# Patient Record
Sex: MALE | Race: WHITE | Employment: FULL TIME | URBAN - METROPOLITAN AREA
[De-identification: names, ages, dates, MRNs, and addresses within clinical notes are randomized per-mention and may not be internally consistent; named-entity substitution may affect disease eponyms.]

---

## 2020-12-22 ENCOUNTER — APPOINTMENT (OUTPATIENT)
Dept: GENERAL RADIOLOGY | Age: 30
End: 2020-12-22
Payer: COMMERCIAL

## 2020-12-22 ENCOUNTER — HOSPITAL ENCOUNTER (EMERGENCY)
Age: 30
Discharge: HOME | End: 2020-12-22
Attending: EMERGENCY MEDICINE
Payer: COMMERCIAL

## 2020-12-22 VITALS
OXYGEN SATURATION: 100 % | HEART RATE: 64 BPM | SYSTOLIC BLOOD PRESSURE: 118 MMHG | TEMPERATURE: 98.6 F | RESPIRATION RATE: 16 BRPM | DIASTOLIC BLOOD PRESSURE: 76 MMHG

## 2020-12-22 PROCEDURE — 73610 X-RAY EXAM OF ANKLE: CPT

## 2020-12-22 PROCEDURE — 73630 X-RAY EXAM OF FOOT: CPT

## 2020-12-22 PROCEDURE — 99282 EMERGENCY DEPT VISIT SF MDM: CPT

## 2020-12-22 ASSESSMENT — ENCOUNTER SYMPTOMS
VOMITING: 0
NAUSEA: 0
RHINORRHEA: 0
CHEST TIGHTNESS: 0
SHORTNESS OF BREATH: 0
SORE THROAT: 0
COUGH: 0

## 2020-12-22 ASSESSMENT — PAIN DESCRIPTION - PAIN TYPE: TYPE: ACUTE PAIN

## 2020-12-22 ASSESSMENT — PAIN DESCRIPTION - LOCATION: LOCATION: FOOT

## 2020-12-22 ASSESSMENT — PAIN SCALES - GENERAL: PAINLEVEL_OUTOF10: 7

## 2020-12-22 ASSESSMENT — PAIN DESCRIPTION - DESCRIPTORS: DESCRIPTORS: ACHING

## 2020-12-22 NOTE — ED PROVIDER NOTES
[address] [facility]  ED  Emergency Department Encounter  Podiatric Medicine and Surgery Resident     Pt Name:George Mcnulty  MRN: [id number]  [place] 1990  Date of evaluation: 12/22/20  PCP:  Сергей Berry MD    CHIEF COMPLAINT       Chief Complaint   Patient presents with    Ankle Pain       HISTORY OF PRESENT ILLNESS  (Location/Symptom, Timing/Onset, Context/Setting, Quality, Duration, Modifying Factors, Severity.)      Elba Avila is a 31 y.o. male who presents with left foot and ankle pain, 4 days since initial onset, burning to dorsal 1st met and aching pain to lateral ankle. The pain is exacerbated with ambulation and relieved with rest.  He has no recollection of the original trauma, but the pain began when walking at work outside. He routinely walks 12-14 miles per day in his work boots and today the pain became too much to suffer through. PAST MEDICAL / SURGICAL / SOCIAL / FAMILY HISTORY      has a past medical history of Asthma. has a past surgical history that includes Finger surgery (Right, 12/5/12) and Hand surgery (Right).     Social History     Socioeconomic History    Marital status: Single     Spouse name: Not on file    Number of children: Not on file    Years of education: Not on file    Highest education level: Not on file   Occupational History    Not on file   Social Needs    Financial resource strain: Not on file    Food insecurity     Worry: Not on file     Inability: Not on file    Transportation needs     Medical: Not on file     Non-medical: Not on file   Tobacco Use    Smoking status: Current Every Day Smoker     Packs/day: 0.50   Substance and Sexual Activity    Alcohol use: No    Drug use: No    Sexual activity: Not on file   Lifestyle    Physical activity     Days per week: Not on file     Minutes per session: Not on file    Stress: Not on file   Relationships    Social connections     Talks on phone: Not on file     Gets together: Not Mouth/Throat:      Mouth: Mucous membranes are moist.      Pharynx: Oropharynx is clear. Eyes:      Extraocular Movements: Extraocular movements intact. Conjunctiva/sclera: Conjunctivae normal.      Pupils: Pupils are equal, round, and reactive to light. Neck:      Musculoskeletal: Normal range of motion and neck supple. Cardiovascular:      Rate and Rhythm: Normal rate and regular rhythm. Pulses: Normal pulses. Heart sounds: Normal heart sounds. Pulmonary:      Effort: Pulmonary effort is normal.      Breath sounds: Normal breath sounds. Abdominal:      General: Bowel sounds are normal.   Musculoskeletal:         General: Tenderness (left lateral ankle gutter) present. No swelling. Skin:     General: Skin is warm. Capillary Refill: Capillary refill takes 2 to 3 seconds. Findings: No bruising, erythema, lesion or rash. Neurological:      General: No focal deficit present. Mental Status: He is alert and oriented to person, place, and time. Psychiatric:         Mood and Affect: Mood normal.         Behavior: Behavior normal.         Thought Content: Thought content normal.         Judgment: Judgment normal.         DIFFERENTIAL  DIAGNOSIS     PLAN (LABS / IMAGING / EKG):  Orders Placed This Encounter   Procedures    XR FOOT LEFT (MIN 3 VIEWS)    XR ANKLE LEFT (MIN 3 VIEWS)       MEDICATIONS ORDERED:  No orders of the defined types were placed in this encounter. DDX: left pedal stress fracture, left ankle sprain, left nerve impingment    DIAGNOSTIC RESULTS / EMERGENCY DEPARTMENT COURSE / MDM   :  No results found for this visit on 12/22/20.     RADIOLOGY:  None    EKG  None    All EKG's are interpreted by the Emergency Department Physician who either signs or Co-signs this chart in the absence of a cardiologist.    IMPRESSION/MDM/EMERGENCY DEPARTMENT COURSE:  Left foot and ankle pain, 4 days, progressively worsening with continued work-related ambulation, relieved with rest.      Xrays of left foot and ankle were negative for acute fracture. Soft tissue injury left foot and ankle. Heel weight bearing with work restriction to sitting for the next 10 (ten) days to allow for adequate soft tissue healing. He will follow up with [facility] tomorrow and Dr. Becca Welsh in 10 days for work clearance. PROCEDURES:  None    CONSULTS:  None    CRITICAL CARE:  None    FINAL IMPRESSION      1.  Sprain of left ankle, unspecified ligament, initial encounter          DISPOSITION / PLAN     DISPOSITION Decision To Discharge 12/22/2020 05:20:08 PMHOME      PATIENT REFERRED TO:  Marilyn Daniels DPM  [address] [address] [address] [facility] [address]    In 10 days  for work clearance      DISCHARGE MEDICATIONS:  New Prescriptions    No medications on file       Denis Burnette [facility] and Surgery Resident    (Please note that portions of thisnote were completed with a voice recognition program.  Efforts were made to edit the dictations but occasionally words are mis-transcribed.)      Vito Patino DPM  Resident  12/22/20 2096

## 2021-03-08 ENCOUNTER — HOSPITAL ENCOUNTER (OUTPATIENT)
Dept: PHYSICAL THERAPY | Facility: CLINIC | Age: 31
Setting detail: THERAPIES SERIES
Discharge: HOME | End: 2021-03-08
Payer: COMMERCIAL

## 2021-03-08 PROCEDURE — 97161 PT EVAL LOW COMPLEX 20 MIN: CPT

## 2021-03-08 NOTE — CONSULTS
[x] [facility]  Outpatient Rehabilitation &  Therapy  [address]  [address]  P: [phone]  F: [phone]     Physical Therapy Lower Extremity Evaluation    Date:  3/8/2021  Patient: Elijah Cortés  : 1990  MRN: [id number]  Physician: Sabrina Lloyd MD    Insurance: [facility] (6 visits until 3/2021)  Medical Diagnosis:   O94.134N (ICD-10-CM) - Sprain of unspecified ligament of left ankle, initial encounter    (ICD-10-CM) - Strain of unspecified muscle and tendon at ankle and foot level, left foot, initial encounter     S93.402A (ICD-10-CM) - Sprain of unspecified ligament of left ankle, initial encounter   C69.542J (ICD-10-CM) - Strain of unspecified muscle and tendon at ankle and foot level, left foot, initial encounter   Rehab Codes: M25.572, M25.672, M25.675, R26.2 NEC, M62.572  Onset date: 2020   Next Dr's appt. : 3/24/21    Subjective:   CC/HPI: Pt is a 31 y.o. male with complaints of left foot/ankle pain that has been present since 2020. Pt reports he started having pain one day at work and it became unbearable and had to go to the ED. Pt does not recall a specific injury that caused the pain. Pt notes he does do a lot of walking throughout the day on even and uneven surfaces in work boots. Pt reports he had x-rays done which did not identify any fx's and was placed on light duty at work, however, his work does not have light duty work for him. Pt reports he has been off work since 2020. Pt states his pain has not changed much since onset. Pt notes he has a constant dull/aching pain over the top of the foot with intermittent burning pain and sharp pains. Pt denies any presence of numbness/tingling or swelling but does report having a red spot on the top of his foot. Pt states his pain ranges from 3-7/10 throughout the day.  Pt reports he has not found a pair of shoes that is more comfortable than others and finds it almost more comfortable to walk barefoot. Pt states he tries not to bend his foot in certain positions because this causes the pain. Pt notes he has increased pain with walking on even and uneven surfaces. Pt also reports he has difficulty and pain negotiating stairs. Pt reports his pain is improved with rest and intermittently baths/showers and ice relief the pain temporarily. Pt reports not taking any medication for pain. Pt also notes he does not wake due to pain. Pt reports for work he has to be able to be on his feet for 10 hour shifts to [employer]. Pt states he walks about 10-14 miles per shift. Pt notes he is also limited in participating in sports with his kids and he has not tried a lot of other activities secondary to the left foot pain. PMHx: [] Unremarkable [] Diabetes [] HTN  [] Pacemaker   [] MI/Heart Problems [] Cancer [] Arthritis   [x] Other: asthma              [] Refer to full medical chart  In EPIC     Tests: [x] X-Ray:   Narrative   EXAMINATION:   THREE XRAY VIEWS OF THE LEFT ANKLE; THREE XRAY VIEWS OF THE LEFT FOOT       12/22/2020 4:53 pm       COMPARISON:   None.       HISTORY:   ORDERING SYSTEM PROVIDED HISTORY: pain in left ankle, laterally   TECHNOLOGIST PROVIDED HISTORY:   pain in left ankle, laterally       FINDINGS:   Frontal, lateral, and oblique views of the ankle/foot are submitted for   review.  There is no evidence for acute fracture or dislocation.  Bony   mineralization is normal for age.  Overlying soft tissues are unremarkable.    No radiopaque foreign body identified.           Impression   No acute osseus abnormality of the ankle/foot.           Medications:  [x] Refer to full medical record [] None [] Other:  Allergies:       [] Refer to full medical record [x] None [] Other:       Yes  No Comments   Fatigue [] [x]    Fever/chills/sweats [] [x]    Malaise  [] [x]    Mental status change [] [x]    Paresthesias/numbness/weakness [] [x]    Unexplained weight changes [] [x]    Lightheaded/dizziness [] [x]    Shortness of breath [] [x]        Objective:  OBSERVATION No Deficit Deficit Comments   Posture   Weight shift to the R foot in standing    Squatting: weight shift to the RLE and decreased WBing DF with squat on the left   Cavo Varus Foot [] [x]    Neutral Foot [x] []    Carlin Valgus Foot [x] []    Gastroc Equinus [] [x]    Hallux Valgus [x] []    Pronation [x] []    Supination [] [x]    Leg Length Discrp [x] []     [] []    Palpation [] [x] LLE: distal 1/3 of the tibia just proximal to syndesmosis  Medial malleolus   Navicular  Anterior talus  1st ray  Pain over medial aspect of dorsum of foot  No pain over the plantar aspect of the medial arch    + for pain with tuning fork over navicular   Sensation [x] []    Edema [] [x] 53 cm on the R   52 cm on the left   Gait [] [x] Analysis: decreased stance time on LLE  Minimal to no push off through the 1st ray  - lateral toes held in extension    Toe walking more painful than heel walking       Balance [] [x] R: 33\"  L: 4\"- pain     *= pain ROM  ° A/P STRENGTH    Left Right Left Right   Ankle PF 33* 58* 3* 3   DF  (knee straight) P: 10*    P: +2 4+* 5   DF   (knee flexed) P: -1*  A: 8* P: 7  A: 13     Inv A: 15* A: 33 4* 5   Ever A: 4* A: 20 4* 5   1st MTP DF P: 50*  37*  A: held in 20 deg of ext P: 58  A: 30 4+* 5   1st MTP PF To neutral  15 4+* 5   Toe extension   4+* 5   Toe flexion   4+* 5   LLE MMT: all painful against resistance  Dorsiflexion: compensation with EDL vs anterior tibialis  Delayed toe musculature activation with flexion/extension    TESTS (+/-) Left   NWBing syndesmotic squeeze test -   WBing syndesmotic squeeze test +   Midfoot compression +   Gastroc Equinus +   Talor Tilt -     Foot/Ankle Mobility  Left  Right   Talocrural Jt Hypo- posterior glide is painful WFL   Subtalar Jt WFL WFL   1st MTP Jt  Hypo into PF WFL   Forefoot WFL WFL   Midfoot  Hypo; painful WFL       FUNCTION: FAAM 53/84 63% max function     Normal Difficult Unable   Sitting [x] [] []   Standing [] [x] []   Ambulation [] [x] []   Groom/Dress [] [x] []   Lift/Carry [] [x] []   Stairs [] [x] []   Bending [] [x] []   Squat [] [x] []   Kneel [] [x] []     Assessment: Felton Boas is a 31 y.o. male with complaints of left foot/ankle pain that has been present since 12/22/2020. Pt reports minimal improvements in left foot/ankle pain since onset and has been off work since 12/22/2020. Pt presents with increased tenderness over the medial aspect of the dorsum of the left foot/ankle (specifically the talonavicular joint), decreased talocrural joint motion due to pain, painful ROM in all planes, and decreased strength of the LLE due to pain. Pt also reports increased discomfort when testing the left foot with a tuning fork and increased pain with midfoot compression is noted. Pt also demonstrates altered gait mechanics secondary to pain with ankle PF or DF in [address] [facility] [address] [address] positins. Based on pt's mechanism of injury with increased walking on even and uneven surfaces at work, there is a concern for a stress fx in the left foot. Pt will benefit from skilled therapeutic interventions to improve left foot/ankle pain, ROM, and strength to promote normalized gait mechanics and return to work, however, based on pt's pain presentation, further evaluation is warranted. Problems:    [x] ? Pain: 3-7/10  [x] ? ROM: painful and decreased left ankle/foot ROM; toes held in extended position  [x] ? Strength: painful and mild decreased left foot/ankle strength  [x] ? Function: FAAM: 63% max function  [x] ? Balance: decreased due to pain   [x] Gait Deviations: altered gait mechanics due to pain      Goals: (to be met in 6 treatments)  1. ? Pain: Pt will report less than or equal to 2-3/10 left ankle/foot pain when standing for greater than 10 minutes and ambulating on even and uneven surfaces in order to return to work.    2. ? ROM: Pt will demonstrate an improvement in left ankle/foot ROM with less than

## 2021-03-09 NOTE — PLAN OF CARE
[] [facility]) - [facility] &  Therapy  [address]  [phone]  F: [phone] [x] [address]  [place] [address]   [address]  P: [phone]  F: [phone] [] [address] &  Therapy  [address]  P: [phone]  F: [phone] [] [address]  P: [phone]  F: [phone] [] [address]  [facility]   [address]   [place]: [phone]  F: [phone]        Physical Therapy Plan of Care    Date:  3/9/2021  Patient: Destini Newsome  : 1990  MRN: [id number]  Physician: Veronica Crawford MD                                         Insurance: [facility] (6 visits until 3/2021)  Medical Diagnosis:   O69.570L (ICD-10-CM) - Sprain of unspecified ligament of left ankle, initial encounter    (ICD-10-CM) - Strain of unspecified muscle and tendon at ankle and foot level, left foot, initial encounter      S93.402A (ICD-10-CM) - Sprain of unspecified ligament of left ankle, initial encounter   X02.979Q (ICD-10-CM) - Strain of unspecified muscle and tendon at ankle and foot level, left foot, initial encounter   Rehab Codes: M25.572, M25.672, M25.675, R26.2 NEC, M62.572  Onset date: 2020                                 Next Dr's appt. : 3/24/21      Subjective:   CC/HPI: Pt is a 31 y.o. male with complaints of left foot/ankle pain that has been present since 2020. Pt reports he started having pain one day at work and it became unbearable and had to go to the ED. Pt does not recall a specific injury that caused the pain. Pt notes he does do a lot of walking throughout the day on even and uneven surfaces in work boots.  Pt reports he had x-rays done which did not identify any fx's and was placed on light duty at work, however, his work does not have light duty work for him. Pt reports he has been off work since 12/22/2020. Pt states his pain has not changed much since onset. Pt notes he has a constant dull/aching pain over the top of the foot with intermittent burning pain and sharp pains. Pt denies any presence of numbness/tingling or swelling but does report having a red spot on the top of his foot. Pt states his pain ranges from 3-7/10 throughout the day. Pt reports he has not found a pair of shoes that is more comfortable than others and finds it almost more comfortable to walk barefoot. Pt states he tries not to bend his foot in certain positions because this causes the pain. Pt notes he has increased pain with walking on even and uneven surfaces. Pt also reports he has difficulty and pain negotiating stairs. Pt reports his pain is improved with rest and intermittently baths/showers and ice relief the pain temporarily. Pt reports not taking any medication for pain. Pt also notes he does not wake due to pain. Pt reports for work he has to be able to be on his feet for 10 hour shifts to [employer]. Pt states he walks about 10-14 miles per shift.  Pt notes he is also limited in participating in sports with his kids and he has not tried a lot of other activities secondary to the left foot pain.      Objective:  Palpation LLE: distal 1/3 of the tibia just proximal to syndesmosis  Medial malleolus   Navicular  Anterior talus  1st ray  Pain over medial aspect of dorsum of foot  No pain over the plantar aspect of the medial arch     + for pain with tuning fork over navicular   Gait  Analysis: decreased stance time on LLE  Minimal to no push off through the 1st ray  - lateral toes held in extension     Toe walking more painful than heel walking       pain ROM  ° A/P STRENGTH     Left Right Left Right   Ankle PF 33* 58* 3* 3   DF  (knee straight) P: 10*     P: +2 4+* 5   DF   (knee flexed) P: -1*  A: 8* P: 7  A: 13       Inv A: 15* A: 33 4* 5 Ever A: 4* A: 20 4* 5   1st MTP DF P: 50*  37*  A: held in 20 deg of ext P: 58  A: 30 4+* 5   1st MTP PF To neutral  15 4+* 5   Toe extension     4+* 5   Toe flexion     4+* 5   LLE MMT: all painful against resistance  Dorsiflexion: compensation with EDL vs anterior tibialis  Delayed toe musculature activation with flexion/extension    TESTS (+/-) Left   NWBing syndesmotic squeeze test -   WBing syndesmotic squeeze test +   Midfoot compression +   Gastroc Equinus +   Talor Tilt -      Foot/Ankle Mobility  Left  Right   Talocrural Jt Hypo- posterior glide is painful WFL   Subtalar Jt WFL WFL   1st MTP Jt  Hypo into PF WFL   Forefoot WFL WFL   Midfoot  Hypo; painful WFL      Assessment: Gale Ann is a 31 y.o. male with complaints of left foot/ankle pain that has been present since 12/22/2020. Pt reports minimal improvements in left foot/ankle pain since onset and has been off work since 12/22/2020. Pt presents with increased tenderness over the medial aspect of the dorsum of the left foot/ankle (specifically the talonavicular joint), decreased talocrural joint motion due to pain, painful ROM in all planes, and decreased strength of the LLE due to pain. Pt also reports increased discomfort when testing the left foot with a tuning fork and increased pain with midfoot compression is noted. Pt also demonstrates altered gait mechanics secondary to pain with ankle PF or DF in [address] [facility] [address] positins. Based on pt's mechanism of injury with increased walking on even and uneven surfaces at work, there is a concern for a stress fx in the left foot. Pt will benefit from skilled therapeutic interventions to improve left foot/ankle pain, ROM, and strength to promote normalized gait mechanics and return to work, however, based on pt's pain presentation, further evaluation is warranted.    Problems:    [x]? ? Pain: 3-7/10  [x]? ? ROM: painful and decreased left ankle/foot ROM; toes held in extended position  [x]? ? Achievement[de-identified]  []? No  [x]? Yes: chronicity of pain with minimal improvement   Domestic Concerns:  [x]? No  []? Yes:        Treatment Plan:  [x]? Therapeutic Exercise                     []? Aquatic Therapy      [x]? Manual Therapy                             []? Electrical Stimulation  [x]? Instruction in HEP                          []? Lumbar/Cervical Traction  [x]? Neuromuscular Re-education       [x]? Cold/hotpack           []? Iontophoresis: 4 mg/mL  [x]? Vasocompression ([employer])                                           Dexamethasone Sodium  [x]? Gait Training                                                                           Phosphate 40-80 mAmin                                []? Medication allergies reviewed for use of               Dexamethasone Sodium Phosphate 4mg/ml                with iontophoresis treatments. Pt is not allergic.     Frequency:  3 x/week for 6 visits    Electronically signed by: Olga Penn PT        Physician Signature:________________________________Date:__________________  By signing above or cosigning this note, I have reviewed this plan of care and certify a need for medically necessary rehabilitation services.      *PLEASE SIGN ABOVE AND FAX BACK ALL PAGES*

## 2021-03-10 ENCOUNTER — HOSPITAL ENCOUNTER (OUTPATIENT)
Dept: PHYSICAL THERAPY | Facility: CLINIC | Age: 31
Setting detail: THERAPIES SERIES
Discharge: HOME | End: 2021-03-10
Payer: COMMERCIAL

## 2021-03-10 PROCEDURE — 97110 THERAPEUTIC EXERCISES: CPT

## 2021-03-10 PROCEDURE — 97140 MANUAL THERAPY 1/> REGIONS: CPT

## 2021-03-10 NOTE — FLOWSHEET NOTE
[] [facility]) - [facility] &  Therapy  [address]  [phone]  F: [phone] [] [address]  [employer] [address]   [address]  P: [phone]  F: [phone] [] [place]  [address]  P: [phone]  F: [phone] [] [email or web address]  P: [phone]  F: [phone] [] [address]  [facility]   [address]   [place]: [phone]  F: [phone]      Physical Therapy Daily Treatment Note    Date:  3/10/2021  Patient Name:  Durga Diop  \"XKSSM\"  :  1990  MRN: [id number]  Physician: Brent Sunshine MD                                         Insurance: [facility] (6 visits until 3/2021)  Medical Diagnosis:   G09.144S (ICD-10-CM) - Sprain of unspecified ligament of left ankle, initial encounter    (ICD-10-CM) - Strain of unspecified muscle and tendon at ankle and foot level, left foot, initial encounter      S93.402A (ICD-10-CM) - Sprain of unspecified ligament of left ankle, initial encounter   O21.172N (ICD-10-CM) - Strain of unspecified muscle and tendon at ankle and foot level, left foot, initial encounter   Rehab Codes: M25.572, M25.672, M25.675, R26.2 NEC, M62.572  Onset date: 2020                                 Next Dr's appt. : 3/24/21    Visit# / total visits: 2/6     Cancels/No Shows: 0/0    Subjective:    Pain:  [] Yes  [] No Location: L ankle Pain Rating: (0-10 scale) 5/10  Pain altered Tx:  [] No  [] Yes  Action:  Comments: pt reports he is having pain in the L ankle, states he has learned to deal with it. Prolong walking increased pain.     Objective:  Modalities:   Precautions:  Exercises:   Exercise Reps/ Time Weight/ Level Comments   Bike  5min            STM 15min  Calf and anterior shin SEATED      gastroc and soleus stretch 3x30\"ea Strap     HS stretch  3x20\" strap    PROM x     4 way ankle  15xea A    Alphabet  1x           Heel raises  20x     Toe raises  20x     Toe curls  15x     Inv, ever sweeps  15xea     BAPS 15xea  A/P, M/L, CW, CCW               STANDING      Ambulation  150'           Heel raises 20x     Toe raises                  Other:       Treatment Charges: Mins Units Time In/Out   []  Modalities        [x]  Ther Exercise 28 2 3:20-3:48   [x]  Manual Therapy 15 1 3:05-3:20   []  Ther Activities      []  Aquatics      []  Vasocompression      []  other       Total Treatment time 43 min 3 3:05-3:48      5min warm up not billed 3-3:05pm      Assessment: [] Progressing toward goals. [x] No change. Tender areas in distal anterior tib and medal calf today with STM. Pt reports increased pain at the end of the session. Some wincing throughout the session due to increased pain. No noticeable antalgia with ambulation and pt able to complete charted exercises without vocal complaints. Pt to report any adverse reactions to this treatment at his next visit. [] Other:  [] Patient would continue to benefit from skilled physical therapy services in order to: improve left foot/ankle pain, ROM, and strength to promote normalized gait mechanics and return to work    Goals: (to be met in 6 treatments)  1. ? Pain: Pt will report less than or equal to 2-3/10 left ankle/foot pain when standing for greater than 10 minutes and ambulating on even and uneven surfaces in order to return to work. 2. ? ROM: Pt will demonstrate an improvement in left ankle/foot ROM with less than or equal to 2-3/10 left foot/ankle pain in order to improve ankle mobility and promote normalized WBing on the LLE and gait mechanics to improve ability to return to work. a. DF: 15 deg  b. PF: 50 deg  c. EV: 10 deg  d. INV: 25 deg  e. Great toe ext: 60 deg (passive)  f.  Great toe flex: 10 deg (active)  3. ? Strength: Pt will demonstrate 5/5 left ankle/foot strength with reports of 0-1/10 pain with MMT in order to improve ability to stand and walk on the LLE to complete work specific tasks. 4. ? Gait: Pt will be able to ambulate 300' with evidence of heel strike and push-off through the 1st ray on the LLE in order to improve gait mechanics to efficiently walk at work and the community. 5. Balance: Pt will be able to complete SLS on the LLE for greater than or equal to 20\" in order to demonstrate improved ankle stability and WBing tolerance to improve tolerance to walking and standing for extended periods of time. 6. ? Function: Pt will score greater than or equal to an 80% on the FAAM in order to demonstrate an improvement in overall function to be able to return to work. 7. Independent with Home Exercise Programs                 Patient goals: \"get back to normal with little to no pain\"    Pt. Education:  [x] Yes  [] No  [] Reviewed Prior HEP/Ed  Method of Education: [x] Verbal  [x] Demo charted exercises for form  [] Written  Comprehension of Education:  [x] Verbalizes understanding. [x] Demonstrates understanding. [x] Needs review. [x] Demonstrates/verbalizes HEP/Ed previously given. Plan: [x] Continue current frequency toward long and short term goals.     [x] Specific Instructions for subsequent treatments: MFR to the calf and anterior shin, dorsum of the foot, toe stretch, pain-free ROM      Time In: 3:00pm            Time Out: 3:48pm    Electronically signed by:  Amarilys Pedro PTA

## 2021-03-12 ENCOUNTER — HOSPITAL ENCOUNTER (OUTPATIENT)
Dept: PHYSICAL THERAPY | Facility: CLINIC | Age: 31
Setting detail: THERAPIES SERIES
Discharge: HOME | End: 2021-03-12
Payer: COMMERCIAL

## 2021-03-12 PROCEDURE — 97140 MANUAL THERAPY 1/> REGIONS: CPT

## 2021-03-12 PROCEDURE — 97110 THERAPEUTIC EXERCISES: CPT

## 2021-03-12 NOTE — FLOWSHEET NOTE
3x30\"ea Strap     HS stretch  3x20\" strap    PROM x     4 way ankle  15xea A    Alphabet  1x           Heel raises  20x     Toe raises  20x     Toe curls  15x     Inv, ever sweeps  15xea     BAPS 15xea Lv 2 A/P, M/L, CW, CCW   Toe yoga 10x ea  Added 3/12/21         STANDING   Held standing exercises 3/12/21   Ambulation             Heel raises      Toe raises                  Other:       Treatment Charges: Mins Units Time In/Out   []  Modalities        [x]  Ther Exercise 21 1 11:45-12:06   [x]  Manual Therapy 15 1 11:30-11:45   []  Ther Activities      []  Aquatics      []  Vasocompression      []  other       Total Treatment time 36 min 2 11:30am-12:06pm          Assessment: [] Progressing toward goals. [] No change. [x] Other: Started session with STM, followed by exercises. Avoided standing exercises this date d/t pain. Observed pt to hold toes in an extended position with hyperactive toe extensors. Difficulty curling toes in an open chain position, but improved toe curls noted with foot on floor. With AROM PF, observed second toe to abduct and cross over great toe. Added toe yoga this date, with a focus on pushing the toes down into the floor instead of toe extension. Pt was able to complete fair when pushing down with the great toe and demonstrates greater challenging when attempting to keep digits 2-5 down while extending the great toe. Pt states his pain to be higher, rating it 6/10 after exercises, commenting that he is more painful than following his previous session. [x] Patient would continue to benefit from skilled physical therapy services in order to: improve left foot/ankle pain, ROM, and strength to promote normalized gait mechanics and return to work    Goals: (to be met in 6 treatments)  1. ? Pain: Pt will report less than or equal to 2-3/10 left ankle/foot pain when standing for greater than 10 minutes and ambulating on even and uneven surfaces in order to return to work.    2. ? ROM: Pt will demonstrate an improvement in left ankle/foot ROM with less than or equal to 2-3/10 left foot/ankle pain in order to improve ankle mobility and promote normalized WBing on the LLE and gait mechanics to improve ability to return to work. a. DF: 15 deg  b. PF: 50 deg  c. EV: 10 deg  d. INV: 25 deg  e. Great toe ext: 60 deg (passive)  f. Great toe flex: 10 deg (active)  3. ? Strength: Pt will demonstrate 5/5 left ankle/foot strength with reports of 0-1/10 pain with MMT in order to improve ability to stand and walk on the LLE to complete work specific tasks. 4. ? Gait: Pt will be able to ambulate 300' with evidence of heel strike and push-off through the 1st ray on the LLE in order to improve gait mechanics to efficiently walk at work and the community. 5. Balance: Pt will be able to complete SLS on the LLE for greater than or equal to 20\" in order to demonstrate improved ankle stability and WBing tolerance to improve tolerance to walking and standing for extended periods of time. 6. ? Function: Pt will score greater than or equal to an 80% on the FAAM in order to demonstrate an improvement in overall function to be able to return to work. 7. Independent with Home Exercise Programs                 Patient goals: \"get back to normal with little to no pain\"    Pt. Education:  [x] Yes  [] No  [x] Reviewed Prior HEP/Ed  Method of Education: [x] Verbal  [x] Demo charted exercises for form  [] Written  Comprehension of Education:  [] Verbalizes understanding. [] Demonstrates understanding. [] Needs review. [x] Demonstrates/verbalizes HEP/Ed previously given. Good memory recall of HEP     Plan: [x] Continue current frequency toward long and short term goals.     [x] Specific Instructions for subsequent treatments: MFR to the calf and anterior shin, dorsum of the foot, toe stretch, pain-free ROM      Time In: 11:30am            Time Out: 12:06pm    Electronically signed by:  Hank Unger PTA

## 2021-03-15 ENCOUNTER — HOSPITAL ENCOUNTER (OUTPATIENT)
Dept: PHYSICAL THERAPY | Facility: CLINIC | Age: 31
Setting detail: THERAPIES SERIES
Discharge: HOME | End: 2021-03-15
Payer: COMMERCIAL

## 2021-03-15 PROCEDURE — 97140 MANUAL THERAPY 1/> REGIONS: CPT

## 2021-03-15 PROCEDURE — 97110 THERAPEUTIC EXERCISES: CPT

## 2021-03-15 NOTE — FLOWSHEET NOTE
[] [facility]) - [facility] &  Therapy  [address]  [phone]  F: [phone] [x] [address]  [facility]   [address]  P: [phone]  F: [phone] [] [place]  [address]  P: [phone]  F: [phone] [] [address]  P: [phone]  F: [phone] [] [address]  [facility]   [address]   [place]: [phone]  F: [phone]      Physical Therapy Daily Treatment Note    Date:  3/15/2021  Patient Name:  Selam Sinclair  \"UXIKW\"  :  1990  MRN: [id number]  Physician: Vickie Melvin MD                                         Insurance: [facility] (6 visits until 3/2021)  Medical Diagnosis:   M98.236F (ICD-10-CM) - Sprain of unspecified ligament of left ankle, initial encounter    (ICD-10-CM) - Strain of unspecified muscle and tendon at ankle and foot level, left foot, initial encounter      S93.402A (ICD-10-CM) - Sprain of unspecified ligament of left ankle, initial encounter   M80.255F (ICD-10-CM) - Strain of unspecified muscle and tendon at ankle and foot level, left foot, initial encounter   Rehab Codes: M25.572, M25.672, M25.675, R26.2 NEC, M62.572  Onset date: 2020                                 Next Dr's appt. : 3/24/21    Visit# / total visits: 3/6     Cancels/No Shows: 0/0    Subjective:    Pain:  [x] Yes  [] No Location: L ankle Pain Rating: (0-10 scale) 2/10  Pain altered Tx:  [x] No  [] Yes  Action:    Comments: Pt reports he an increase in left ankle/foot pain following the last session.      Objective:  Modalities:   Precautions:  Exercises: bolded completed 03/15/21   Exercise Reps/ Time Weight/ Level Comments   Bike  5min            STM 19'  Calf and anterior shin Programs                 Patient goals: \"get back to normal with little to no pain\"    Pt. Education:  [x] Yes  [] No  [x] Reviewed Prior HEP/Ed  Method of Education: [x] Verbal  [] Demo   [] Written  Comprehension of Education:  [x] Verbalizes understanding. [] Demonstrates understanding. [] Needs review. [x] Demonstrates/verbalizes HEP/Ed previously given. Good memory recall of HEP     Plan: [x] Continue current frequency toward long and short term goals.     [x] Specific Instructions for subsequent treatments: MFR to the calf and anterior shin, dorsum of the foot, toe stretch, pain-free ROM      Time In: 1003 am            Time Out: 1047 am    Electronically signed by:  Toña Bailey PT

## 2021-03-17 ENCOUNTER — HOSPITAL ENCOUNTER (OUTPATIENT)
Dept: PHYSICAL THERAPY | Facility: CLINIC | Age: 31
Setting detail: THERAPIES SERIES
Discharge: HOME | End: 2021-03-17
Payer: COMMERCIAL

## 2021-03-17 PROCEDURE — 97140 MANUAL THERAPY 1/> REGIONS: CPT

## 2021-03-17 PROCEDURE — 97110 THERAPEUTIC EXERCISES: CPT

## 2021-03-17 NOTE — FLOWSHEET NOTE
[] [facility]) [facility] &  Therapy  [address]  [phone]  F: [phone] [x] [address]  [facility]   [address]  P: [phone]  F: [phone] [] [place]  [address]  P: [phone]  F: [phone] [] [address]  P: [phone]  F: [phone] [] [address]  [facility]   [address]   [place]: [phone]  F: [phone]      Physical Therapy Daily Treatment Note    Date:  3/17/2021  Patient Name:  Selam Sinclair  \"NVCQJ\"  :  1990  MRN: [id number]  Physician: Vickie Melvin MD                                         Insurance: [facility] (6 visits until 3/20/21)  Medical Diagnosis:   M96.879V (ICD-10-CM) - Sprain of unspecified ligament of left ankle, initial encounter    (ICD-10-CM) - Strain of unspecified muscle and tendon at ankle and foot level, left foot, initial encounter      S93.402A (ICD-10-CM) - Sprain of unspecified ligament of left ankle, initial encounter   O69.297X (ICD-10-CM) - Strain of unspecified muscle and tendon at ankle and foot level, left foot, initial encounter   Rehab Codes: M25.572, M25.672, M25.675, R26.2 NEC, M62.572  Onset date: 2020                                 Next Dr's appt. : 3/24/21    Visit# / total visits: 4/6     Cancels/No Shows: 0/0    Subjective:    Pain:  [x] Yes  [] No Location: L ankle Pain Rating: (0-10 scale) 2/10  Pain altered Tx:  [x] No  [] Yes  Action:    Comments: Pt states the pain is sharper but not constant. Increased pain with walking more than 5-10min and when he flexes his toes with PF.      Objective:  Modalities:   Precautions:  Exercises: bolded completed 21   Exercise Reps/ Time Weight/ Level Comments   Bike  5min STM 15'  Calf and anterior shin               SEATED      gastroc stretch 3x1\" Strap     Ankle pumps 3x10  Focus on knee extension   HS stretch  3x30\" strap    PROM x     4 way ankle  2x10 Lime     Alphabet  1x           Heel raises  20x     Toe raises  20x     Toe curls  15x     Inv, ever sweeps  20xea     BAPS 15xea Lv 2 A/P, M/L, CW, CCW   Toe yoga 10x ea  10x alt  Added 3/12/21   Toe splaying 2x10  Added 3/15         STANDING      Ambulation  2 laps     High knee march 2x25'     Tandem walk  2x25'     Side stepping  2x25'ea     Tandem balance  3x15\"ea  No LOB   SLS  3x15\"ea  Minimal wiggle    Heel raises 20x     Toe raises 20x                 Other:  Rigid foot- mid foot pain with inversion and eversion more so with inversion- attempted gentle dorsal/plantar glides of cuboid and naviclar respectively to imrpove pain with minimal improvements     Treatment Charges: Mins Units Time In/Out   []  Modalities        [x]  Ther Exercise 25 2 2:23-2-48   [x]  Manual Therapy 15 1 2:08-2:23   []  Ther Activities      []  Aquatics      []  Vasocompression      []  other       Total Treatment time 40 min 3 2:08-2:48    5min bike warm up not billed 2:03-2:08      Assessment: [x] Progressing toward goals. Able to advance standing exercises today with minimal complaints. Cuing and instruction required for the new exercises, pt notes slight increase in pain at the end of the session, but notes its not as bad. Increased time standing and with standing activities today. [] No change. [x] Other:  [x] Patient would continue to benefit from skilled physical therapy services in order to: improve left foot/ankle pain, ROM, and strength to promote normalized gait mechanics and return to work    Goals: (to be met in 6 treatments)  1. ? Pain: Pt will report less than or equal to 2-3/10 left ankle/foot pain when standing for greater than 10 minutes and ambulating on even and uneven surfaces in order to return to work.    2. ? ROM: Pt will demonstrate an improvement in left ankle/foot ROM with less than or equal to 2-3/10 left foot/ankle pain in order to improve ankle mobility and promote normalized WBing on the LLE and gait mechanics to improve ability to return to work. a. DF: 15 deg  b. PF: 50 deg  c. EV: 10 deg  d. INV: 25 deg  e. Great toe ext: 60 deg (passive)  f. Great toe flex: 10 deg (active)  3. ? Strength: Pt will demonstrate 5/5 left ankle/foot strength with reports of 0-1/10 pain with MMT in order to improve ability to stand and walk on the LLE to complete work specific tasks. 4. ? Gait: Pt will be able to ambulate 300' with evidence of heel strike and push-off through the 1st ray on the LLE in order to improve gait mechanics to efficiently walk at work and the community. 5. Balance: Pt will be able to complete SLS on the LLE for greater than or equal to 20\" in order to demonstrate improved ankle stability and WBing tolerance to improve tolerance to walking and standing for extended periods of time. 6. ? Function: Pt will score greater than or equal to an 80% on the FAAM in order to demonstrate an improvement in overall function to be able to return to work. 7. Independent with Home Exercise Programs                 Patient goals: \"get back to normal with little to no pain\"    Pt. Education:  [x] Yes  [] No  [x] Reviewed Prior HEP/Ed  Method of Education: [x] Verbal  [x] Demo new standing activities   [] Written  Comprehension of Education:  [x] Verbalizes understanding. [x] Demonstrates understanding. [] Needs review. [x] Demonstrates/verbalizes HEP/Ed previously given. Good memory recall of HEP     Plan: [x] Continue current frequency toward long and short term goals.     [x] Specific Instructions for subsequent treatments: MFR to the calf and anterior shin, dorsum of the foot, toe stretch, pain-free ROM      Time In: 2:03m            Time Out: 2:48 am    Electronically signed by:  Courtney Huynh PTA

## 2021-03-19 ENCOUNTER — HOSPITAL ENCOUNTER (OUTPATIENT)
Dept: MRI IMAGING | Facility: CLINIC | Age: 31
Discharge: HOME | End: 2021-03-21
Payer: COMMERCIAL

## 2021-03-19 ENCOUNTER — HOSPITAL ENCOUNTER (OUTPATIENT)
Dept: PHYSICAL THERAPY | Facility: CLINIC | Age: 31
Setting detail: THERAPIES SERIES
Discharge: HOME | End: 2021-03-19
Payer: COMMERCIAL

## 2021-03-19 DIAGNOSIS — S96.912A STRAIN OF LEFT ANKLE, INITIAL ENCOUNTER: ICD-10-CM

## 2021-03-19 DIAGNOSIS — S93.402A SPRAIN OF LEFT ANKLE, UNSPECIFIED LIGAMENT, INITIAL ENCOUNTER: ICD-10-CM

## 2021-03-19 PROCEDURE — 73721 MRI JNT OF LWR EXTRE W/O DYE: CPT

## 2021-03-19 PROCEDURE — 97140 MANUAL THERAPY 1/> REGIONS: CPT

## 2021-03-19 PROCEDURE — 97110 THERAPEUTIC EXERCISES: CPT

## 2021-03-19 PROCEDURE — 73718 MRI LOWER EXTREMITY W/O DYE: CPT

## 2021-03-19 NOTE — FLOWSHEET NOTE
[] [facility]) [facility] &  Therapy  [address]  [phone]  F: [phone] [x] [address]  [place] [address]   [address]  P: [phone]  F: [phone] [] [place]  [address]  P: [phone]  F: [phone] [] [address]  P: [phone]  F: [phone] [] [address]  [facility]   [address]   [place]: [phone]  F: [phone]      Physical Therapy Daily Treatment Note    Date:  3/19/2021  Patient Name:  Michell Grissom  \"BKBVH\"  :  1990  MRN: [id number]  Physician: Bola Dixon MD                                         Insurance: [facility] (6 visits until 3/20/21)  Medical Diagnosis:   U39.067B (ICD-10-CM) - Sprain of unspecified ligament of left ankle, initial encounter    (ICD-10-CM) - Strain of unspecified muscle and tendon at ankle and foot level, left foot, initial encounter      S93.402A (ICD-10-CM) - Sprain of unspecified ligament of left ankle, initial encounter   L39.541G (ICD-10-CM) - Strain of unspecified muscle and tendon at ankle and foot level, left foot, initial encounter   Rehab Codes: M25.572, M25.672, M25.675, R26.2 NEC, M62.572  Onset date: 2020                                 Next Dr's appt. : 3/24/21    Visit# / total visits: 6/6     Cancels/No Shows: 0/0    Subjective:    Pain:  [x] Yes  [] No Location: L ankle Pain Rating: (0-10 scale) 2/10  Pain altered Tx:  [x] No  [] Yes  Action:    Comments: Pt states the pain is sharper but not constant. Increased pain with walking more than 5-10min and when he flexes his toes with PF.      Objective:  Modalities:   Precautions:  Exercises: bolded completed 21   Exercise Reps/ Time Weight/ Level Comments   Bike  8min STM 11'  Calf and anterior shin               SEATED      gastroc stretch 3x1\" Strap     Ankle pumps 3x10  Focus on knee extension   HS stretch  3x30\" strap    PROM x     4 way ankle  2x10 Lime     Alphabet  1x           Heel raises  20x     Toe raises  20x     Toe curls  15x     Inv, ever sweeps  20xea     BAPS 15xea Lv 2 A/P, M/L, CW, CCW   Toe yoga 10x ea  10x alt  Added 3/12/21   Toe splaying 2x10  Added 3/15         STANDING      Ambulation  2 laps     High knee march 2x25'     Tandem walk  2x25'     Side stepping  2x25'ea     Tandem balance  3x15\"ea  No LOB   SLS  3x15\"ea  Minimal wiggle    Heel raises 20x     Toe raises 20x                 Other:  Rigid foot- mid foot pain with inversion and eversion more so with inversion- attempted gentle dorsal/plantar glides of cuboid and naviclar respectively to imrpove pain with minimal improvements    MMT 3/19 - all produce pain. a. DF:  4+/5  b. PF: 4/5  c. EV: 4+/5  d. INV: 4/5  e. Great toe ext: 4+/5  f. Great toe flex: 4+/5     Treatment Charges: Mins Units Time In/Out   []  Modalities        [x]  Ther Exercise 30 2 11:22- 11:52   [x]  Manual Therapy 11 1 11:09-11:21   []  Ther Activities      []  Aquatics      []  Vasocompression      []  other       Total Treatment time 41 min 3 11:09- 11:52    8min bike warm up not billed 11:00-11:08      Assessment: [] Progressing toward goals. .    [x] No change. Time spent reassessing goals this date. At this time patient has not met goals that were assigned during evaluation; see below. Pt has pain with ROM ( active and passive) as well as MMT. Pt reports he is compliant with HEP. Notes his pain increased this date through out session. Required cueing to ensure toe contact to ground during ambulation. Pt continues to lack full heel strike and push off and required cueing to improve. Pt with LOB during SLS with out shoe on and unable to achieve 20 seconds. Pt will be getting MRI this afternoon.      [] Other:  [x] Patient would continue to benefit from skilled physical therapy services in order to: improve left foot/ankle pain, ROM, and strength to promote normalized gait mechanics and return to work    Goals: (to be met in 6 treatments) Reassessed by Yana Almonte PTA  2. ? Pain: Pt will report less than or equal to 2-3/10 left ankle/foot pain when standing for greater than 10 minutes and ambulating on even and uneven surfaces in order to return to work. Not met 7/10 pain recently   3. ? ROM: Pt will demonstrate an improvement in left ankle/foot ROM with less than or equal to 2-3/10 left foot/ankle pain in order to improve ankle mobility and promote normalized WBing on the LLE and gait mechanics to improve ability to return to work. Not met; see below. Pain will all ROM   a. DF: 15 deg  Knee flexed 8* knee extended 3*  b. PF: 50 deg 35*  c. EV: 10 deg 12*  d. INV: 25 deg 15*  e. Great toe ext: 60 deg (passive) 30*  f. Great toe flex: 10 deg (active) 9*  4. ? Strength: Pt will demonstrate 5/5 left ankle/foot strength with reports of 0-1/10 pain with MMT in order to improve ability to stand and walk on the LLE to complete work specific tasks. Not Met: see values above, pain with all   5. ? Gait: Pt will be able to ambulate 300' with evidence of heel strike and push-off through the 1st ray on the LLE in order to improve gait mechanics to efficiently walk at work and the community. Ongoing. Required cuing to improve toe contact with ground  6. Balance: Pt will be able to complete SLS on the LLE for greater than or equal to 20\" in order to demonstrate improved ankle stability and WBing tolerance to improve tolerance to walking and standing for extended periods of time. Not met. 3 trials : 9 sec, 12 sec and 18 sec  7. ? Function: Pt will score greater than or equal to an 80% on the FAAM in order to demonstrate an improvement in overall function to be able to return to work. Not met 63%  8.  Independent with Home Exercise Programs - Met; reports compliance. Patient goals: \"get back to normal with little to no pain\"    Pt. Education:  [x] Yes  [] No  [x] Reviewed Prior HEP/Ed  Method of Education: [x] Verbal  [] Demo new standing activities   [] Written  Comprehension of Education:  [x] Verbalizes understanding. [x] Demonstrates understanding. [] Needs review. [x] Demonstrates/verbalizes HEP/Ed previously given. Good memory recall of HEP     Plan: [x] Continue current frequency toward long and short term goals.   - MRI today, follow up with MD  [] Specific Instructions for subsequent treatments:       Time In: 11:00 am            Time Out: 11:55 am    Electronically signed by:  Raiza Gay PTA

## 2021-03-22 NOTE — PROGRESS NOTES
[] PlDanny Crane [facility]  Outpatient Rehabilitation &  Therapy  [address]  [phone]  F: [phone] [x] [address]  [facility] [address]   [address]  P: [phone]  F: [phone] [] [address] &  Therapy  [address]  P: [phone]  F: [phone] [] [address]  P: [phone]  F: [phone] [] [address]  [facility]   [address]   [place]: [phone]  F: [phone]      Physical Therapy Progress Note    Date: 3/22/2021      Patient: Jessica Ayala  : 1990  MRN: [id number]    Bonnie Shane MD                                         Insurance: [facility] (6 visits until 3/20/21)  Medical Diagnosis:   L73.298Q (ICD-10-CM) - Sprain of unspecified ligament of left ankle, initial encounter   S96.912A (ICD-10-CM) - Strain of unspecified muscle and tendon at ankle and foot level, left foot, initial encounter      S93.402A (ICD-10-CM) - Sprain of unspecified ligament of left ankle, initial encounter   Z42.702S (ICD-10-CM) - Strain of unspecified muscle and tendon at ankle and foot level, left foot, initial encounter   Rehab Codes: M25.572, M25.672, M25.675, R26.2 NEC, M62.572  Onset date: 2020                                 Next Dr's appt. : 3/24/21     Visit# / total visits: 6/6                                    Cancels/No Shows: 0/0    Date range of services: 3/8/21 to 3/19/21      Subjective:    Pain:  [x]? Yes  []? No   Location: L ankle         Pain Rating: (0-10 scale) 2/10  Pain altered Tx:  [x]? No  []? Yes  Action:     Comments: Pt states the pain is sharper but not constant. Increased pain with walking more than 5-10min and when he flexes his toes with PF. Objective:  MMT 3/19 - all produce pain. a.  DF:  4+/5  b. PF: work specific tasks. Not Met: see values above, pain with all   5. ? Gait: Pt will be able to ambulate 300' with evidence of heel strike and push-off through the 1st ray on the LLE in order to improve gait mechanics to efficiently walk at work and the community. Ongoing. Required cuing to improve toe contact with ground  6. Balance: Pt will be able to complete SLS on the LLE for greater than or equal to 20\" in order to demonstrate improved ankle stability and WBing tolerance to improve tolerance to walking and standing for extended periods of time. Not met. 3 trials : 9 sec, 12 sec and 18 sec  7. ? Function: Pt will score greater than or equal to an 80% on the FAAM in order to demonstrate an improvement in overall function to be able to return to work. Not met 63%  8. Independent with Home Exercise Programs - Met; reports compliance. Treatment Plan:  [x] Therapeutic Exercise   75443  [] Iontophoresis: 4 mg/mL Dexamethasone Sodium Phosphate  mAmin  94397   [] Therapeutic Activity  64357 [] Vasopneumatic cold with compression  91373    [] Gait Training   18517 [] Ultrasound   95644   [] Neuromuscular Re-education  69182 [] Electrical Stimulation Unattended  64863   [x] Manual Therapy  11180 [] Electrical Stimulation Attended  25467   [x] Instruction in HEP  [] Lumbar/Cervical Traction  06889   [] Aquatic Therapy   16390 [] Cold/hotpack    [] Massage   76984      [] Dry Needling, 1 or 2 muscles  22496   [] Biofeedback, first 15 minutes   78651  [] Biofeedback, additional 15 minutes   67342 [] Dry Needling, 3 or more muscles  24997       Patient Status:     [] Continue per initial plan of care. [] Additional visits necessary. [x] Other: Finished all 6 visits and pending MRI results will determine further action     Electronically signed by Sheila Lainez PT on 3/22/2021 at 1:57 PM      If you have any questions or concerns, please don't hesitate to call.   Thank you for your referral.    Physician Signature:________________________________Date:__________________  By signing above or cosigning this note, I have reviewed this plan of care and certify a need for medically necessary rehabilitation services.      *PLEASE SIGN ABOVE AND FAX BACK ALL PAGES*

## 2021-04-06 NOTE — DISCHARGE SUMMARY
[] Moshe Crane 47  Outpatient Rehabilitation &  Therapy  [address]  [phone]  F: [phone] [x] [address]  [facility]   [address]  P: [phone]  F: [phone] [] [address] &  Therapy  [address] [facility]  P: [phone]  F: [phone] [] [address]  P: [phone]  F: [phone] [] [address]  [facility]   [address]   [place]: [phone]  F: [phone]      Physical Therapy Discharge Note    Date: 3/26/2021      Patient: Jennifer Wright  : 1990  MRN: [id number]    Callie Wayne MD                                         Insurance: [address] [place] [address] (6 visits until 3/20/21)  Medical Diagnosis:   D80.225M (ICD-10-CM) - Sprain of unspecified ligament of left ankle, initial encounter    (ICD-10-CM) - Strain of unspecified muscle and tendon at ankle and foot level, left foot, initial encounter      S93.402A (ICD-10-CM) - Sprain of unspecified ligament of left ankle, initial encounter   G48.725B (ICD-10-CM) - Strain of unspecified muscle and tendon at ankle and foot level, left foot, initial encounter   Rehab Codes: M25.572, M25.672, M25.675, R26.2 NEC, M62.572  Onset date: 2020                                 Next 's appt. : 3/24/21     Visit# / total visits: 6/6                                    Cancels/No Shows: 0/0     Date range of services: 3/8/21 to 3/19/21        Subjective:    Pain:  [x]? ? Yes  []? ? No   Location: L ankle         Pain Rating: (0-10 scale) 2/10  Pain altered Tx:  [x]? ? No  []? ? Yes  Action:     Comments: Pt states the pain is sharper but not constant.  Increased pain with walking more than 5-10min and when he flexes his toes with PF.      Objective:  MMT 3/19 - all produce pain.  a. DF:  4+/5  b. PF: 4/5  c. EV: 4+/5  d. INV: 4/5  e. Great toe ext: 4+/5  f. Great toe flex: 4+/5     Left foot/ankle AROM- all painful  a. DF:  Knee flexed 8* knee extended 3*  b. PF: 35*  c. EV: 12*  d. INV:  15*  e. Great toe ext:  30*  f. Great toe flex:  9*     Assessment: Selam Sinclair has completed all 6 authorized physical therapy visits without significant changes in left foot/ankle pain. Pt continues to report pain with all AROM of the left ankle in the areas of the left talus and navicular region. Pt also continues to report pain with MMT of the left foot and ankle. Gait demonstrates improvements in toe contact, however, pt notes with this foot position, pt reports increased foot/ankle pain. At this time, pt has an MRI scheduled due to left foot/ankle pain not improving since December of 2020. Pending MRI results will determine further treatment from PT, however, at this point no improvements have been noted with attempts to decrease pain and improve AROM/strength to improve WBing and walking tolerance. Goals: (to be met in 6 treatments) Reassessed by Cecelia Valdivia PTA  1. ? Pain: Pt will report less than or equal to 2-3/10 left ankle/foot pain when standing for greater than 10 minutes and ambulating on even and uneven surfaces in order to return to work. Not met 7/10 pain recently   2. ? ROM: Pt will demonstrate an improvement in left ankle/foot ROM with less than or equal to 2-3/10 left foot/ankle pain in order to improve ankle mobility and promote normalized WBing on the LLE and gait mechanics to improve ability to return to work.  Not met; see below. Pain will all ROM   a. DF: 15 deg  Knee flexed 8* knee extended 3*  b. PF: 50 deg 35*  c. EV: 10 deg 12*  d.  INV: 25 deg 15*  e. Great toe ext: 60 deg (passive) 30*  f. Great toe flex: 10 deg (active) 9*  3. ? Strength: Pt will demonstrate 5/5 left ankle/foot strength with reports of 0-1/10 pain with MMT in order to improve ability to stand and walk on the LLE to complete work specific tasks. Not Met: see values above, pain with all   4. ? Gait: Pt will be able to ambulate 300' with evidence of heel strike and push-off through the 1st ray on the LLE in order to improve gait mechanics to efficiently walk at work and the community. Ongoing. Required cuing to improve toe contact with ground  5. Balance: Pt will be able to complete SLS on the LLE for greater than or equal to 20\" in order to demonstrate improved ankle stability and WBing tolerance to improve tolerance to walking and standing for extended periods of time. Not met. 3 trials : 9 sec, 12 sec and 18 sec  6. ? Function: Pt will score greater than or equal to an 80% on the FAAM in order to demonstrate an improvement in overall function to be able to return to work. Not met 63%  7. Independent with Home Exercise Programs - Met; reports compliance.     Treatment Plan:  [x]? Therapeutic Exercise   73917            []? Iontophoresis: 4 mg/mL Dexamethasone Sodium Phosphate  mAmin  40407   []? Therapeutic Activity  69004 []? Vasopneumatic cold with compression  [id number]              []? Gait Training             15713 []? Ultrasound      [id number]   []? Neuromuscular Re-education  [id number] []? Electrical Stimulation Unattended  [id number]   [x]? Manual Therapy  16764 []? Electrical Stimulation Attended  [id number]   [x]? Instruction in HEP    []? Lumbar/Cervical Traction  [id number]   []? Aquatic Therapy        [id number] []? Cold/hotpack              []? Massage   [id number]      []? Dry Needling, 1 or 2 muscles  93621   []? Biofeedback, first 15 minutes   67231  []? Biofeedback, additional 15 minutes   29072 []? Dry Needling, 3 or more muscles  91788            Discharge Status:     [] Pt recovered from conditions. Treatment goals were met. [] Pt received maximum benefit. No further therapy indicated at this time. [] Pt to continue exercise/home instructions independently.     [] Therapy interrupted due to:    [] Pt has 2 or more no shows/cancels, is discontinued per our policy. [x] Pt has completed prescribed number of treatment sessions. [x] Other: Pt was referred back and obtained an MRI on 3/19/21         Electronically signed by [facility], PT on 3/26/2021 at 12:34 PM      If you have any questions or concerns, please don't hesitate to call.   Thank you for your referral. [Back Pain] : ~T back pain [Muscle Aches] : muscle aches [NI] : Endocrine [Nl] : Hematologic/Lymphatic